# Patient Record
(demographics unavailable — no encounter records)

---

## 2025-05-12 NOTE — ASSESSMENT
[FreeTextEntry1] : Impression: Well aligned fracture left distal radius.  The previous cast has been removed and replaced with a streamlined molded short arm cast uneventfully.  I discussed cast care and activities with mom no playground until further notice return to 1/2 weeks with x-rays of the left wrist and possible removal of the cast at that time

## 2025-05-12 NOTE — HISTORY OF PRESENT ILLNESS
[FreeTextEntry1] : This 4-year-old healthy child with normal development is seen today for evaluation of the left upper extremity.  She was well until approximately 1 week ago when she fell from a zip line while playing sustaining injury.  She was seen at [facility] where she x-rays revealed a fracture and she was immobilized.  She is comfortable on today's visit.  Past medical history is negative

## 2025-05-12 NOTE — PHYSICAL EXAM
[FreeTextEntry1] : Examination today a short arm cast that has been applied bivalved it is extremely large and heavy for this child is inappropriate.  Moving the fingers well neurovascular status is intact  Review of recent x-rays of the left upper extremity [facility] revealed a well aligned fracture of distal radius

## 2025-05-27 NOTE — HISTORY OF PRESENT ILLNESS
[FreeTextEntry1] : This 4-year-old returns for follow-up of her left wrist fracture.  She is quite comfortable mother has no complaints

## 2025-05-27 NOTE — ASSESSMENT
[FreeTextEntry1] : Impression: Fracture left distal radius.  The cast has been removed there is no significant local tenderness present.  To begin motion exercises she will return to the playground in 10 days time return here as needed

## 2025-05-27 NOTE — PHYSICAL EXAM
[FreeTextEntry1] : On exam the child is very comfortable in her cast no foul smell moving the fingers well no swelling.  X-rays ordered and taken today 3 views of the left wrist reviewed interpreted by myself reveal satisfactory alignment progressive healing of the distal radius fracture